# Patient Record
(demographics unavailable — no encounter records)

---

## 2024-10-24 NOTE — PHYSICAL EXAM
[Alert] : alert [No Acute Distress] : no acute distress [Normocephalic] : normocephalic [Anterior [place] Closed] : anterior fontanelle closed [Red Reflex Bilateral] : red reflex bilateral [PERRL] : PERRL [Normally Placed Ears] : normally placed ears [Auricles Well Formed] : auricles well formed [Clear Tympanic membranes with present light reflex and bony landmarks] : clear tympanic membranes with present light reflex and bony landmarks [No Discharge] : no discharge [Nares Patent] : nares patent [Palate Intact] : palate intact [Uvula Midline] : uvula midline [Tooth Eruption] : tooth eruption  [Supple, full passive range of motion] : supple, full passive range of motion [No Palpable Masses] : no palpable masses [Symmetric Chest Rise] : symmetric chest rise [Clear to Auscultation Bilaterally] : clear to auscultation bilaterally [Regular Rate and Rhythm] : regular rate and rhythm [S1, S2 present] : S1, S2 present [No Murmurs] : no murmurs [+2 Femoral Pulses] : +2 femoral pulses [Soft] : soft [NonTender] : non tender [Non Distended] : non distended [Normoactive Bowel Sounds] : normoactive bowel sounds [No Hepatomegaly] : no hepatomegaly [No Splenomegaly] : no splenomegaly [Shaun 1] : Shaun 1 [No Clitoromegaly] : no clitoromegaly [Normal Vaginal Introitus] : normal vaginal introitus [Patent] : patent [Normally Placed] : normally placed [No Abnormal Lymph Nodes Palpated] : no abnormal lymph nodes palpated [No Clavicular Crepitus] : no clavicular crepitus [Negative Rajan-Ortalani] : negative Rajan-Ortalani [Symmetric Buttocks Creases] : symmetric buttocks creases [No Spinal Dimple] : no spinal dimple [NoTuft of Hair] : no tuft of hair [Cranial Nerves Grossly Intact] : cranial nerves grossly intact [No Rash or Lesions] : no rash or lesions

## 2024-10-24 NOTE — DEVELOPMENTAL MILESTONES
[Normal Development] : Normal Development [None] : none [Yes] : Completed. [Imitates scribbling] : imitates scribbling [Drinks from cup with little] : drinks from cup with little spilling [Points to ask for something] : points to ask for something or to get help [Uses 3 words other than names] : uses 3 words other than names [Speaks in sounds that seem like] : speaks in sounds that seem like an unknown language [Follows directions that do not] : follows direction that do not include a gesture [Looks when parent says,] : looks when parent says, "Where is...?" [Squats to  objects] : squats to  objects [Crawls up a few steps] : crawls up a few steps [Begins to run] : begins to run [Makes yamileth with crayon] : makes yamileth with sageyon [Drops object into and takes object] : drops object into and takes object out of container

## 2024-10-24 NOTE — PHYSICAL EXAM
[Alert] : alert [No Acute Distress] : no acute distress [Normocephalic] : normocephalic [Anterior [place] Closed] : anterior fontanelle closed [Red Reflex Bilateral] : red reflex bilateral [PERRL] : PERRL [Normally Placed Ears] : normally placed ears [Auricles Well Formed] : auricles well formed [Clear Tympanic membranes with present light reflex and bony landmarks] : clear tympanic membranes with present light reflex and bony landmarks [No Discharge] : no discharge [Nares Patent] : nares patent [Palate Intact] : palate intact [Uvula Midline] : uvula midline [Tooth Eruption] : tooth eruption  [Supple, full passive range of motion] : supple, full passive range of motion [No Palpable Masses] : no palpable masses [Symmetric Chest Rise] : symmetric chest rise [Clear to Auscultation Bilaterally] : clear to auscultation bilaterally [Regular Rate and Rhythm] : regular rate and rhythm [S1, S2 present] : S1, S2 present [No Murmurs] : no murmurs [+2 Femoral Pulses] : +2 femoral pulses [Soft] : soft [NonTender] : non tender [Non Distended] : non distended [Normoactive Bowel Sounds] : normoactive bowel sounds [No Hepatomegaly] : no hepatomegaly [No Splenomegaly] : no splenomegaly [Shaun 1] : Shaun 1 [No Clitoromegaly] : no clitoromegaly [Normal Vaginal Introitus] : normal vaginal introitus [Patent] : patent [Normally Placed] : normally placed [No Abnormal Lymph Nodes Palpated] : no abnormal lymph nodes palpated [No Clavicular Crepitus] : no clavicular crepitus [Negative Rajan-Ortalani] : negative Rajan-Ortalani [Symmetric Buttocks Creases] : symmetric buttocks creases [No Spinal Dimple] : no spinal dimple [NoTuft of Hair] : no tuft of hair [Cranial Nerves Grossly Intact] : cranial nerves grossly intact [No Rash or Lesions] : no rash or lesions

## 2024-10-25 NOTE — HISTORY OF PRESENT ILLNESS
[Mother] : mother [Fruit] : fruit [Vegetables] : vegetables [Meat] : meat [Cereal] : cereal [Eggs] : eggs [Finger Foods] : finger foods [Table food] : table food [Vitamin ___] : Patient takes [unfilled] vitamin daily [___ stools per day] : [unfilled]  stools per day [Normal] : Normal [In crib] : In crib [Sippy cup use] : Sippy cup use [Brushing teeth] : Brushing teeth [Vitamin] : Primary Fluoride Source: Vitamin [Playtime] : Playtime [No] : Not at  exposure [Water heater temperature set at <120 degrees F] : Water heater temperature set at <120 degrees F [Car seat in back seat] : Car seat in back seat [Carbon Monoxide Detectors] : Carbon monoxide detectors [Smoke Detectors] : Smoke detectors [Wakes up at night] : Wakes up at night [NO] : No [Cow's milk (Ounces per day ___)] : consumes [unfilled] oz of cow's milk per day [Exposure to electronic nicotine delivery system] : No exposure to electronic nicotine delivery system [de-identified] : Lactaid milk 18 oz [FreeTextEntry3] : Night waking.  Sleeps 6 hr stretch then wakes.   2 hr nap  Stays in bed.  Music lights off [de-identified] : straw [de-identified] : MMR, Varivax.  Flu declined [FreeTextEntry1] : 15 mo  well female here for physical and vaccinations.  Lactose intolerance-drinks Lactaid milk Ex 34.6 wk Congenital single kidney - Seen by 01/08/24 by Nephrology-US Interval growth of the solitary right kidney. She had an UTI at 6 mo old pan sensitive Ecoli UTI in Dec 2023 when she was also having diarrhea and was treated inpatient with iv ceftriaxone and completed Cephalexin course after that. Was sent for a VCUG in the setting of UTI with solitary kidney which was negative. Last seen 07/11/24 by nephro Dr Liliane Kline-  normal BP and UA. Kidney US showed normal right kidney with normal interval growth. Carrol will require to continue to monitor growth with repeat Sono after 1 year. Patients with solitary kidney should have annual surveillance, including a blood pressure measurement, serum creatinine if not initially normal, and urinalysis to detect proteinuria.  Plan Ensure proper hydration. 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  8/14/23- Dr. Eddy- Peds Cardio - Innocent heart murmur/PFO and PPS- likely to resolve spontaneously over the first year 2/27/2024 developmental F/U-Ex Premie 34 6/7 wk. reaching milestones no need for EI f/u.

## 2024-10-25 NOTE — HISTORY OF PRESENT ILLNESS
[Mother] : mother [Fruit] : fruit [Vegetables] : vegetables [Meat] : meat [Cereal] : cereal [Eggs] : eggs [Finger Foods] : finger foods [Table food] : table food [Vitamin ___] : Patient takes [unfilled] vitamin daily [___ stools per day] : [unfilled]  stools per day [Normal] : Normal [In crib] : In crib [Sippy cup use] : Sippy cup use [Brushing teeth] : Brushing teeth [Vitamin] : Primary Fluoride Source: Vitamin [Playtime] : Playtime [No] : Not at  exposure [Water heater temperature set at <120 degrees F] : Water heater temperature set at <120 degrees F [Car seat in back seat] : Car seat in back seat [Carbon Monoxide Detectors] : Carbon monoxide detectors [Smoke Detectors] : Smoke detectors [Wakes up at night] : Wakes up at night [NO] : No [Cow's milk (Ounces per day ___)] : consumes [unfilled] oz of cow's milk per day [Exposure to electronic nicotine delivery system] : No exposure to electronic nicotine delivery system [de-identified] : Lactaid milk 18 oz [FreeTextEntry3] : Night waking.  Sleeps 6 hr stretch then wakes.   2 hr nap  Stays in bed.  Music lights off [de-identified] : straw [de-identified] : MMR, Varivax.  Flu declined [FreeTextEntry1] : 15 mo  well female here for physical and vaccinations.  Lactose intolerance-drinks Lactaid milk Ex 34.6 wk Congenital single kidney - Seen by 01/08/24 by Nephrology-US Interval growth of the solitary right kidney. She had an UTI at 6 mo old pan sensitive Ecoli UTI in Dec 2023 when she was also having diarrhea and was treated inpatient with iv ceftriaxone and completed Cephalexin course after that. Was sent for a VCUG in the setting of UTI with solitary kidney which was negative. Last seen 07/11/24 by nephro Dr Liliane Kline-  normal BP and UA. Kidney US showed normal right kidney with normal interval growth. Carrol will require to continue to monitor growth with repeat Sono after 1 year. Patients with solitary kidney should have annual surveillance, including a blood pressure measurement, serum creatinine if not initially normal, and urinalysis to detect proteinuria.  Plan Ensure proper hydration. 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  8/14/23- Dr. Eddy- Peds Cardio - Innocent heart murmur/PFO and PPS- likely to resolve spontaneously over the first year 2/27/2024 developmental F/U-Ex Premie 34 6/7 wk. reaching milestones no need for EI f/u.

## 2024-10-25 NOTE — DISCUSSION/SUMMARY
[Normal Growth] : growth [Normal Development] : development [None] : No known medical problems [No Elimination Concerns] : elimination [No Feeding Concerns] : feeding [No Skin Concerns] : skin [Normal Sleep Pattern] : sleep [Communication and Social Development] : communication and social development [Sleep Routines and Issues] : sleep routines and issues [Temper Tantrums and Discipline] : temper tantrums and discipline [Healthy Teeth] : healthy teeth [Safety] : safety [No Medications] : ~He/She~ is not on any medications [Parent/Guardian] : parent/guardian [] : The components of the vaccine(s) to be administered today are listed in the plan of care. The disease(s) for which the vaccine(s) are intended to prevent and the risks have been discussed with the caretaker.  The risks are also included in the appropriate vaccination information statements which have been provided to the patient's caregiver.  The caregiver has given consent to vaccinate. [FreeTextEntry1] : 15 mo  well female here for physical and vaccinations.  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 She had an UTI at 6 mo old pan sensitive Ecoli UTI in Dec 2023 when she was also having diarrhea and was treated inpatient with iv ceftriaxone and completed Cephalexin course after that. Was sent for a VCUG in the setting of UTI with solitary kidney which was negative. Last seen 07/11/24 by nephro Dr Liliane Kline-  normal BP and UA. Kidney US showed normal right kidney with normal interval growth. Carrol will require to continue to monitor growth with repeat Sono after 1 year. Patients with solitary kidney should have annual surveillance, including a blood pressure measurement, serum creatinine if not initially normal, and urinalysis to detect proteinuria.  SWYC-passed. Lead screen reviewed.  Anticipatory guidance given.  MMR, Varivax given.  Continue whole cow's milk. Continue table foods, 3 meals with 2-3 snacks per day. Incorporate fluorinated water daily in a sippy cup. Brush teeth twice a day with soft toothbrush. Recommend visit to dentist. When in car, patient should be in rear-facing car seat in back seat if under 20 lbs. As per seat 's guidelines, maintain rear-facing car seat in back seat of car. Put baby to sleep in own crib. Lower crib matress. Help baby to maintain consistent daily routines and sleep schedule. Recognize stranger and separation anxiety. Ensure home is safe since baby is increasingly mobile. Be within arm's reach of baby at all times. Use consistent, positive discipline. Read aloud to baby.  Return in 3 mo for 18 mo well child check.

## 2024-12-03 NOTE — PHYSICAL EXAM
[Alert] : alert [EOMI] : grossly EOMI [Clear Rhinorrhea] : clear rhinorrhea [Erythematous Oropharynx] : erythematous oropharynx [Supple] : supple [Clear to Auscultation Bilaterally] : clear to auscultation bilaterally [Regular Rate and Rhythm] : regular rate and rhythm [Soft] : soft [Moves All Extremities x 4] : moves all extremities x4 [Normotonic] : normotonic [Warm] : warm [Acute Distress] : no acute distress [Wheezing] : no wheezing [Rhonchi] : no rhonchi [Tender] : nontender [FreeTextEntry3] : BOM

## 2024-12-03 NOTE — REVIEW OF SYSTEMS
[Fever] : fever [Malaise] : malaise [Difficulty with Sleep] : difficulty with sleep [Nasal Discharge] : nasal discharge [Nasal Congestion] : nasal congestion [Cough] : cough [Negative] : Skin

## 2024-12-03 NOTE — HISTORY OF PRESENT ILLNESS
[de-identified] : Fever/Cough [FreeTextEntry6] : Started about 1 week ago with stuffy and runny nose and hacky and phlegmy cough and low-grade fever on and off. Increased sleeping. No gagging with the cough and feels worse in the AM. Little restless sleep. NL appetite. No pulling at her ears. Possibly hears cough in chest at times. No D/C/loose stools. V x 2 last week with milk. No one else sick at home. No other known sick contacts.

## 2024-12-18 NOTE — HISTORY OF PRESENT ILLNESS
[de-identified] : BOM [FreeTextEntry6] : Pt seen 12/03/24 dx BOM/Fever/Cough/Nasal congestion- Quick Strep negative Quick Flu negative for A and B Rapid COVID negative S/P Amoxil 400 mg/tsp 5 ml 2x/day with food for 10 days.  Pt has no nasal congestion, no ear pain, no cough, nl po, nl activity.

## 2024-12-18 NOTE — DISCUSSION/SUMMARY
[FreeTextEntry1] : Pt seen 12/03/24 dx BOM/Fever/Cough/Nasal congestion- S/P 10 d Amoxicillin.  Exam WNL.  Reassurance given.

## 2024-12-18 NOTE — HISTORY OF PRESENT ILLNESS
[de-identified] : BOM [FreeTextEntry6] : Pt seen 12/03/24 dx BOM/Fever/Cough/Nasal congestion- Quick Strep negative Quick Flu negative for A and B Rapid COVID negative S/P Amoxil 400 mg/tsp 5 ml 2x/day with food for 10 days.  Pt has no nasal congestion, no ear pain, no cough, nl po, nl activity.

## 2024-12-20 NOTE — HISTORY OF PRESENT ILLNESS
[de-identified] : BOM [FreeTextEntry6] : Pt seen 12/03/24 dx BOM/fever/cough/nasal congestion-Quick Flu negative for A and B Rapid COVID negative Amoxil 400 mg/tsp 5 ml 2x/day with food for 10 days. Increase clear fluids/ Steam/Honey/ Ices/Smoothies/Soups/Probiotics/Tylenol and/or Motrin as needed. Pt here for 2 wk recheck.

## 2024-12-20 NOTE — HISTORY OF PRESENT ILLNESS
[de-identified] : BOM [FreeTextEntry6] : Pt seen 12/03/24 dx BOM/fever/cough/nasal congestion-Quick Flu negative for A and B Rapid COVID negative Amoxil 400 mg/tsp 5 ml 2x/day with food for 10 days. Increase clear fluids/ Steam/Honey/ Ices/Smoothies/Soups/Probiotics/Tylenol and/or Motrin as needed. Pt here for 2 wk recheck.

## 2024-12-20 NOTE — HISTORY OF PRESENT ILLNESS
[de-identified] : BOM [FreeTextEntry6] : Pt seen 12/03/24 dx BOM/fever/cough/nasal congestion-Quick Flu negative for A and B Rapid COVID negative Amoxil 400 mg/tsp 5 ml 2x/day with food for 10 days. Increase clear fluids/ Steam/Honey/ Ices/Smoothies/Soups/Probiotics/Tylenol and/or Motrin as needed. Pt here for 2 wk recheck.

## 2025-02-22 NOTE — DISCUSSION/SUMMARY
[FreeTextEntry1] : 19 month old patient with viral gastroenteritis, now recovering and improved. - in order to maintain hydration, consume "oral rehydration solution," such as Pedialyte - If vomiting, try to give child a few teaspoons of fluid every few minutes.  - Avoid sugar and dairy as these can make diarrhea worse - Best to consume lean meats, fruits, vegetables, and whole-grain breads and cereals- resume normal diet as soon as possible - monitor for signs of dehydration as discussed in the office  All questions addressed. Return if symptoms worsen.

## 2025-02-22 NOTE — HISTORY OF PRESENT ILLNESS
[de-identified] : diarrhea [FreeTextEntry6] : Diarrhea started earlier this week. Spoke with Dr Baird and advised to limit dairy and sugar and monitor for worsening symptoms. Carrol has improved. Only had one episode this morning but that was after Father gave her bottle of milk. Otherwise she is doing well. No fevers. Having less frequent episodes of diarrhea and at baseline behavior. Remaining well hydrated and making good urine diapers.

## 2025-05-13 NOTE — PHYSICAL EXAM

## 2025-05-13 NOTE — PHYSICAL EXAM

## 2025-05-14 NOTE — DEVELOPMENTAL MILESTONES
[Normal Development] : Normal Development [Passed] : passed [Yes] : Completed. [None] : none [Engages with others for play] : engages with others for play [Help dress and undress self] : help dress and undress self [Points to pictures in book] : points to pictures in book [Points to object of interest to] : points to object of interest to draw attention to it [Turns and looks at adult if] : turns and looks at adult if something new happens [Begins to scoop with spoon] : begins to scoop with spoon [Uses 6 to 10 words other than] : uses 6 to 10 words other than names [Identifies at least 2 body parts] : identifies at least 2 body parts [Walks up with 2 feet per step] : walks up with 2 feet per step with hand held [Sits in small chair] : sits in small chair [Carries toy while walking] : carries toy while walking [Scribbles spontaneously] : scribbles spontaneously [Throws small ball a few feet] : throws a small ball a few feet while standing

## 2025-05-14 NOTE — HISTORY OF PRESENT ILLNESS
[Normal] : Normal [Water heater temperature set at <120 degrees F] : Water heater temperature set at <120 degrees F [Car seat in back seat] : Car seat in back seat [Carbon Monoxide Detectors] : Carbon monoxide detectors [Smoke Detectors] : Smoke detectors [Up to date] : Up to date [NO] : No [Mother] : mother [Cow's milk (Ounces per day ___)] : consumes [unfilled] oz of Cow's milk per day [Fruit] : fruit [Vegetables] : vegetables [Meat] : meat [Cereal] : cereal [Eggs] : eggs [Baby food] : baby food [Finger Foods] : finger foods [Table food] : table food [___ stools per day] : [unfilled]  stools per day [Loose] : loose consistency [___ voids per day] : [unfilled] voids per day [Pacifier use] : Pacifier use [Toothpaste] : Primary Fluoride Source: Toothpaste [Playtime] : Playtime  [No] : Not at  exposure [Exposure to electronic nicotine delivery system] : No exposure to electronic nicotine delivery system [FreeTextEntry7] : No hospitalization/Surgeries, Specialist visit. [de-identified] : None [FreeTextEntry1] : 22 mo well female here for physical and vaccinations. 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 Ucx grew pan-sensitive E. coli  with a negative VCUG.Normal BP and UA. Kidney US showed normal right kidney with normal interval growth. Carrol will require to continue to monitor growth with repeat Sono after 1 year.   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  8/14/23- Dr. Eddy- [facility] - Innocent heart murmur/PFO and PPS- likely to resolve spontaneously over the first year  2/27/2024 developmental F/U-Ex Premie 34 6/7 wk. reaching milestones no need for EI f/u in 6 months

## 2025-05-14 NOTE — HISTORY OF PRESENT ILLNESS
[Normal] : Normal [Water heater temperature set at <120 degrees F] : Water heater temperature set at <120 degrees F [Car seat in back seat] : Car seat in back seat [Carbon Monoxide Detectors] : Carbon monoxide detectors [Smoke Detectors] : Smoke detectors [Up to date] : Up to date [NO] : No [Mother] : mother [Cow's milk (Ounces per day ___)] : consumes [unfilled] oz of Cow's milk per day [Fruit] : fruit [Vegetables] : vegetables [Meat] : meat [Cereal] : cereal [Eggs] : eggs [Baby food] : baby food [Finger Foods] : finger foods [Table food] : table food [___ stools per day] : [unfilled]  stools per day [Loose] : loose consistency [___ voids per day] : [unfilled] voids per day [Pacifier use] : Pacifier use [Toothpaste] : Primary Fluoride Source: Toothpaste [Playtime] : Playtime  [No] : Not at  exposure [Exposure to electronic nicotine delivery system] : No exposure to electronic nicotine delivery system [FreeTextEntry7] : No hospitalization/Surgeries, Specialist visit. [de-identified] : None [FreeTextEntry1] : 22 mo well female here for physical and vaccinations. 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 Ucx grew pan-sensitive E. coli  with a negative VCUG.Normal BP and UA. Kidney US showed normal right kidney with normal interval growth. Carrol will require to continue to monitor growth with repeat Sono after 1 year.   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  8/14/23- Dr. Eddy- [facility] - Innocent heart murmur/PFO and PPS- likely to resolve spontaneously over the first year  2/27/2024 developmental F/U-Ex Premie 34 6/7 wk. reaching milestones no need for EI f/u in 6 months